# Patient Record
Sex: MALE | Race: WHITE | NOT HISPANIC OR LATINO | URBAN - METROPOLITAN AREA
[De-identification: names, ages, dates, MRNs, and addresses within clinical notes are randomized per-mention and may not be internally consistent; named-entity substitution may affect disease eponyms.]

---

## 2020-03-04 ENCOUNTER — OFFICE VISIT (OUTPATIENT)
Dept: CARDIOLOGY | Facility: PHYSICIAN GROUP | Age: 65
End: 2020-03-04
Payer: MEDICAID

## 2020-03-04 VITALS
BODY MASS INDEX: 27.3 KG/M2 | WEIGHT: 206 LBS | HEART RATE: 72 BPM | DIASTOLIC BLOOD PRESSURE: 70 MMHG | HEIGHT: 73 IN | SYSTOLIC BLOOD PRESSURE: 100 MMHG | OXYGEN SATURATION: 93 %

## 2020-03-04 DIAGNOSIS — H93.19 TINNITUS, UNSPECIFIED LATERALITY: ICD-10-CM

## 2020-03-04 DIAGNOSIS — R00.2 PALPITATIONS: ICD-10-CM

## 2020-03-04 DIAGNOSIS — R42 VERTIGO: ICD-10-CM

## 2020-03-04 DIAGNOSIS — R06.09 DOE (DYSPNEA ON EXERTION): ICD-10-CM

## 2020-03-04 DIAGNOSIS — I10 ESSENTIAL HYPERTENSION: ICD-10-CM

## 2020-03-04 PROCEDURE — 93000 ELECTROCARDIOGRAM COMPLETE: CPT | Performed by: INTERNAL MEDICINE

## 2020-03-04 PROCEDURE — 99204 OFFICE O/P NEW MOD 45 MIN: CPT | Performed by: INTERNAL MEDICINE

## 2020-03-04 RX ORDER — HYDROXYZINE HYDROCHLORIDE 25 MG/1
25 TABLET, FILM COATED ORAL 3 TIMES DAILY PRN
COMMUNITY
Start: 2020-01-31

## 2020-03-04 RX ORDER — POLYETHYLENE GLYCOL 3350 17 G/17G
POWDER, FOR SOLUTION ORAL
COMMUNITY
Start: 2019-12-19

## 2020-03-04 RX ORDER — PREDNISONE 20 MG/1
TABLET ORAL
COMMUNITY
Start: 2020-02-03 | End: 2020-03-04

## 2020-03-04 RX ORDER — LAMOTRIGINE 25 MG/1
TABLET ORAL
COMMUNITY
Start: 2020-01-31 | End: 2020-03-04

## 2020-03-04 RX ORDER — FLUTICASONE PROPIONATE 50 MCG
SPRAY, SUSPENSION (ML) NASAL
COMMUNITY
Start: 2020-01-31

## 2020-03-04 NOTE — LETTER
[facility]   [address]  [place] [address]  Phone: [phone]  Fax: [phone]              Velasquez Yepez  1955    Encounter Date: 3/4/2020    Linda Verduzco M.D.          PROGRESS NOTE:  Subjective:   Chief Complaint:   Chief Complaint   Patient presents with   • Palpitations   • HTN (Controlled)       Velasquez Yepez is a 64 y.o. male who is referred by Dr. Toshia Thomason to establish with cardiology.     A few years ago someone mentioned he would have HTN.  Then he was inpt for phychiatric care, noted heart racing and saw vein pulsing quickly.  He recalls feeling dizzy and feeling the heart racing.  Has some baseline vertigo and tinnitus but both get worse with the racing.  Feels vision might be compromised.  Initially felt slightly fast and forceful and then later irregular.  Was happening several times per day.  Possible made worse by anxiety.  Was placed on metoprolol ~ Nov 2019 for HTN and arrhythmia.  He rarely has it now.  He feels well.    Has been on metoprolol.    Drinks 2 cups of coffee daily.    Has hypertension by history but only on metoprolol and blood pressure borderline low today.    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    He is not limited by chest pain, pressure or tightness with activity.   Used to walk 4-5 miles, now can walk about 1 mile.  Does have mild dyspnea on exertion.  No orthopnea or lower extremity swelling.   Again, has vertigo and some imbalance issues which he atributes to hepatitis C.  No presyncope/syncope.   No symptoms of leg claudication.   No stroke/TIA like symptoms.    Older son had CVAs in the setting of ETOH and drug use.  No family history of premature coronary artery disease.  No atrial fibrillation.  Former smoker, quit more than 30 years.  No hyperlipidemia.  No history of diabetes.  No history of autoimmune disease such as lupus or rheumatoid  arthritis.  No chronic kidney disease.    Has chronic hepatitis C, working on qualifying for treatment.  Has bipolar disorder and general anxiety disorder.  Has PTSD.  His 22 yo grand daughter , left behind a 7 month old child.  Has 2 sons, live in [facility].    Lives in [place], moved here at 19.  Worked in [employer].  Now living in apartment, good access to food, medications.    I did review PCP note dated 2019, psychiatric history noted.    DATA REVIEWED by me:  ECG 3-4-2020  Sinus, 68    ECG 2019  Sinus, rate 78    Echo pending    Most recent labs:     None available    No results found for: HEMOGLOBIN, HEMATOCRIT, MCV, INR   No results found for: SODIUM, POTASSIUM, CHLORIDE, CO2, GLUCOSE, BUN, CREATININE, GLOMRATE   No results found for: ASTSGOT, ALTSGPT, ALBUMIN   No results found for: CHOLSTRLTOT, LDL, HDL, TRIGLYCERIDE      History reviewed. No pertinent past medical history.  History reviewed. No pertinent surgical history.  History reviewed. No pertinent family history.  Social History     Socioeconomic History   • Marital status: Unknown     Spouse name: Not on file   • Number of children: Not on file   • Years of education: Not on file   • Highest education level: Not on file   Occupational History   • Not on file   Social Needs   • Financial resource strain: Not on file   • Food insecurity     Worry: Not on file     Inability: Not on file   • Transportation needs     Medical: Not on file     Non-medical: Not on file   Tobacco Use   • Smoking status: Former Smoker     Years: 50.00     Types: Cigarettes   • Smokeless tobacco: Never Used   Substance and Sexual Activity   • Alcohol use: Not Currently     Comment: in AAA   • Drug use: Not Currently     Types: Marijuana, Cocaine     Comment: last used 30 year form    • Sexual activity: Not on file   Lifestyle   • Physical activity     Days per week: Not on file     Minutes per session: Not on file   • Stress: Not on file    "  Relationships   • Social connections     Talks on phone: Not on file     Gets together: Not on file     Attends Shinto service: Not on file     Active member of club or organization: Not on file     Attends meetings of clubs or organizations: Not on file     Relationship status: Not on file   • Intimate partner violence     Fear of current or ex partner: Not on file     Emotionally abused: Not on file     Physically abused: Not on file     Forced sexual activity: Not on file   Other Topics Concern   • Not on file   Social History Narrative   • Not on file     Allergies   Allergen Reactions   • Oxycodone Unspecified     Aggressive and angry        Current Outpatient Medications   Medication Sig Dispense Refill   • fluticasone (FLONASE) 50 MCG/ACT nasal spray USE 1 SPRAY(S) IN EACH NOSTRIL TWICE DAILY     • hydrOXYzine HCl (ATARAX) 25 MG Tab Take 25 mg by mouth 3 times a day as needed.     • metoprolol (LOPRESSOR) 25 MG Tab Take 25 mg by mouth 2 times a day.     • polyethylene glycol/lytes (MIRALAX) Pack MIX 1 PACKET WITH 8 OUNCES OF WATER JUICE SODA COFFEE OR TEA AND DRINK ONCE DAILY       No current facility-administered medications for this visit.        ROS  All others systems reviewed and negative.     Objective:     /70 (BP Location: Right arm, Patient Position: Sitting)   Pulse 72   Ht 1.854 m (6' 1\")   Wt 93.4 kg (206 lb)   SpO2 93%  Body mass index is 27.18 kg/m².    Physical Exam   General: No acute distress. Well nourished.  HEENT: EOM grossly intact, no scleral icterus, no pharyngeal erythema.   Neck:  No JVD, no bruits, trachea midline  CVS: RRR. Normal S1, S2. No M/R/G. No LE edema.  2+ radial pulses, 2+ PT pulses  Resp: CTAB. No wheezing or crackles/rhonchi. Normal respiratory effort.  Abdomen: Soft, NT, no nidia hepatomegaly.  MSK/Ext: No clubbing or cyanosis.  Skin: Warm and dry, no rashes.  Neurological: CN III-XII grossly intact. No focal deficits.   Psych: A&O x 3, appropriate " affect, good judgement      Assessment:     1. Palpitations  EKG    EC-ECHOCARDIOGRAM COMPLETE W/O CONT    Comp Metabolic Panel    CBC WITHOUT DIFFERENTIAL    TSH WITH REFLEX TO FT4    Holter Monitor / Event Recorder   2. Essential hypertension  EKG    Lipid Profile   3. Vertigo  EC-ECHOCARDIOGRAM COMPLETE W/O CONT   4. Tinnitus, unspecified laterality     5. PAGAN (dyspnea on exertion)         Medical Decision Making:  Today's Assessment / Status / Plan:     -His palpitations are well controlled, the only issue would be looking for afib/flutter.  -Echo to assess left atrial size.  -Blood work, TSH, CBC, CMP  -Lipids to assess 10 year risk  -No concerns about Hep C treatment  -For now, 24 hour holter,  I will consider stopping metoprolol and monitor longer depending on echo and 24 hour results.  -RTC 4-6 weeks to go over results, decide if more testing is needed and in the meantime we will call with results.      Return in about 4 weeks (around 4/1/2020), or AB or Dax 4-6 weeks.    It is my pleasure to participate in the care of Mr. Yepez.  Please do not hesitate to contact me with questions or concerns.    Linda Verduzco MD, [facility]  Cardiologist [facility]    Please note that this dictation was created using voice recognition software. I have made every reasonable attempt to correct obvious errors, but it is possible there are errors of grammar and possibly content that I did not discover before finalizing the note.      Toshia Thomason M.D.  [address] [facility] [address]  VIA Facsimile: [phone]

## 2020-03-04 NOTE — PROGRESS NOTES
Subjective:   Chief Complaint:   Chief Complaint   Patient presents with   • Palpitations   • HTN (Controlled)       Velasquez Yepez is a 64 y.o. male who is referred by Dr. Toshia Thomason to establish with cardiology.     A few years ago someone mentioned he would have HTN.  Then he was inpt for phychiatric care, noted heart racing and saw vein pulsing quickly.  He recalls feeling dizzy and feeling the heart racing.  Has some baseline vertigo and tinnitus but both get worse with the racing.  Feels vision might be compromised.  Initially felt slightly fast and forceful and then later irregular.  Was happening several times per day.  Possible made worse by anxiety.  Was placed on metoprolol ~ 2019 for HTN and arrhythmia.  He rarely has it now.  He feels well.    Has been on metoprolol.    Drinks 2 cups of coffee daily.    Has hypertension by history but only on metoprolol and blood pressure borderline low today.    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    He is not limited by chest pain, pressure or tightness with activity.   Used to walk 4-5 miles, now can walk about 1 mile.  Does have mild dyspnea on exertion.  No orthopnea or lower extremity swelling.   Again, has vertigo and some imbalance issues which he atributes to hepatitis C.  No presyncope/syncope.   No symptoms of leg claudication.   No stroke/TIA like symptoms.    Older son had CVAs in the setting of ETOH and drug use.  No family history of premature coronary artery disease.  No atrial fibrillation.  Former smoker, quit more than 30 years.  No hyperlipidemia.  No history of diabetes.  No history of autoimmune disease such as lupus or rheumatoid arthritis.  No chronic kidney disease.    Has chronic hepatitis C, working on qualifying for treatment.  Has bipolar disorder and general anxiety disorder.  Has PTSD.  His 22 yo grand daughter , left behind a 7 month old child.  Has 2 sons, live in [place] [facility] [place].    Lives in [place], moved here at 19.  Worked in [employer].  Now living in apartment, good access to food, medications.    I did review PCP note dated 12/11/2019, psychiatric history noted.    DATA REVIEWED by me:  ECG 3-4-2020  Sinus, 68    ECG 12/11/2019  Sinus, rate 78    Echo pending    Most recent labs:     None available    No results found for: HEMOGLOBIN, HEMATOCRIT, MCV, INR   No results found for: SODIUM, POTASSIUM, CHLORIDE, CO2, GLUCOSE, BUN, CREATININE, GLOMRATE   No results found for: ASTSGOT, ALTSGPT, ALBUMIN   No results found for: CHOLSTRLTOT, LDL, HDL, TRIGLYCERIDE      History reviewed. No pertinent past medical history.  History reviewed. No pertinent surgical history.  History reviewed. No pertinent family history.  Social History     Socioeconomic History   • Marital status: Unknown     Spouse name: Not on file   • Number of children: Not on file   • Years of education: Not on file   • Highest education level: Not on file   Occupational History   • Not on file   Social Needs   • Financial resource strain: Not on file   • Food insecurity     Worry: Not on file     Inability: Not on file   • Transportation needs     Medical: Not on file     Non-medical: Not on file   Tobacco Use   • Smoking status: Former Smoker     Years: 50.00     Types: Cigarettes   • Smokeless tobacco: Never Used   Substance and Sexual Activity   • Alcohol use: Not Currently     Comment: in AAA   • Drug use: Not Currently     Types: Marijuana, Cocaine     Comment: last used 30 year form 2020   • Sexual activity: Not on file   Lifestyle   • Physical activity     Days per week: Not on file     Minutes per session: Not on file   • Stress: Not on file   Relationships   • Social connections     Talks on phone: Not on file     Gets together: Not on file     Attends Congregational service: Not on file     Active member of club or organization: Not on file     Attends meetings of clubs or organizations: Not on file     Relationship  "status: Not on file   • Intimate partner violence     Fear of current or ex partner: Not on file     Emotionally abused: Not on file     Physically abused: Not on file     Forced sexual activity: Not on file   Other Topics Concern   • Not on file   Social History Narrative   • Not on file     Allergies   Allergen Reactions   • Oxycodone Unspecified     Aggressive and angry        Current Outpatient Medications   Medication Sig Dispense Refill   • fluticasone (FLONASE) 50 MCG/ACT nasal spray USE 1 SPRAY(S) IN EACH NOSTRIL TWICE DAILY     • hydrOXYzine HCl (ATARAX) 25 MG Tab Take 25 mg by mouth 3 times a day as needed.     • metoprolol (LOPRESSOR) 25 MG Tab Take 25 mg by mouth 2 times a day.     • polyethylene glycol/lytes (MIRALAX) Pack MIX 1 PACKET WITH 8 OUNCES OF WATER JUICE SODA COFFEE OR TEA AND DRINK ONCE DAILY       No current facility-administered medications for this visit.        ROS  All others systems reviewed and negative.     Objective:     /70 (BP Location: Right arm, Patient Position: Sitting)   Pulse 72   Ht 1.854 m (6' 1\")   Wt 93.4 kg (206 lb)   SpO2 93%  Body mass index is 27.18 kg/m².    Physical Exam   General: No acute distress. Well nourished.  HEENT: EOM grossly intact, no scleral icterus, no pharyngeal erythema.   Neck:  No JVD, no bruits, trachea midline  CVS: RRR. Normal S1, S2. No M/R/G. No LE edema.  2+ radial pulses, 2+ PT pulses  Resp: CTAB. No wheezing or crackles/rhonchi. Normal respiratory effort.  Abdomen: Soft, NT, no nidia hepatomegaly.  MSK/Ext: No clubbing or cyanosis.  Skin: Warm and dry, no rashes.  Neurological: CN III-XII grossly intact. No focal deficits.   Psych: A&O x 3, appropriate affect, good judgement      Assessment:     1. Palpitations  EKG    EC-ECHOCARDIOGRAM COMPLETE W/O CONT    Comp Metabolic Panel    CBC WITHOUT DIFFERENTIAL    TSH WITH REFLEX TO FT4    Holter Monitor / Event Recorder   2. Essential hypertension  EKG    Lipid Profile   3. Vertigo  " EC-ECHOCARDIOGRAM COMPLETE W/O CONT   4. Tinnitus, unspecified laterality     5. PAGAN (dyspnea on exertion)         Medical Decision Making:  Today's Assessment / Status / Plan:     -His palpitations are well controlled, the only issue would be looking for afib/flutter.  -Echo to assess left atrial size.  -Blood work, TSH, CBC, CMP  -Lipids to assess 10 year risk  -No concerns about Hep C treatment  -For now, 24 hour holter,  I will consider stopping metoprolol and monitor longer depending on echo and 24 hour results.  -RTC 4-6 weeks to go over results, decide if more testing is needed and in the meantime we will call with results.      Return in about 4 weeks (around 4/1/2020), or AB or Dax 4-6 weeks.    It is my pleasure to participate in the care of Mr. Yepez.  Please do not hesitate to contact me with questions or concerns.    Linda Verduzco MD, [facility]  Cardiologist [facility]    Please note that this dictation was created using voice recognition software. I have made every reasonable attempt to correct obvious errors, but it is possible there are errors of grammar and possibly content that I did not discover before finalizing the note.

## 2020-03-11 ENCOUNTER — NON-PROVIDER VISIT (OUTPATIENT)
Dept: CARDIOLOGY | Facility: PHYSICIAN GROUP | Age: 65
End: 2020-03-11
Payer: MEDICAID

## 2020-03-11 ENCOUNTER — ANCILLARY PROCEDURE (OUTPATIENT)
Dept: CARDIOLOGY | Facility: IMAGING CENTER | Age: 65
End: 2020-03-11
Attending: INTERNAL MEDICINE
Payer: MEDICAID

## 2020-03-11 DIAGNOSIS — R00.2 PALPITATIONS: ICD-10-CM

## 2020-03-11 DIAGNOSIS — R42 VERTIGO: ICD-10-CM

## 2020-03-11 LAB
LV EJECT FRACT  99904: 65
LV EJECT FRACT MOD 2C 99903: 55.14
LV EJECT FRACT MOD 4C 99902: 57.16
LV EJECT FRACT MOD BP 99901: 56.66

## 2020-03-11 PROCEDURE — 93306 TTE W/DOPPLER COMPLETE: CPT | Performed by: INTERNAL MEDICINE

## 2020-03-13 ENCOUNTER — TELEPHONE (OUTPATIENT)
Dept: HEALTH INFORMATION MANAGEMENT | Facility: OTHER | Age: 65
End: 2020-03-13

## 2020-03-13 NOTE — TELEPHONE ENCOUNTER
1. Caller Name: Velasquez Yepez                      Call Back Number: [phone]  [facility] PCP or Specialty Provider: Yes         2.  Does patient have any active symptoms of respiratory illness (fever OR cough OR shortness of breath)? No.    3.  Does patient have any comoribidities? None     4.  In the last 30 days, has the patient traveled outside of the country OR in a high risk area within the [place] OR have any known contact with someone who has or is suspected to have COVID-19?  No.    5. Disposition: Advised to perform self care, monitor for worsening symptoms and to call back in 3 days if no improvement      Note routed to PCP: JULIA only.

## 2020-03-18 ENCOUNTER — TELEPHONE (OUTPATIENT)
Dept: CARDIOLOGY | Facility: MEDICAL CENTER | Age: 65
End: 2020-03-18

## 2020-03-20 ENCOUNTER — TELEPHONE (OUTPATIENT)
Dept: CARDIOLOGY | Facility: MEDICAL CENTER | Age: 65
End: 2020-03-20

## 2020-03-20 DIAGNOSIS — R00.2 PALPITATIONS: ICD-10-CM

## 2020-03-20 NOTE — TELEPHONE ENCOUNTER
need FUTURE holter order only, LS ordered 24 hour holter, dx: palps   Received: Today   Message Contents   Renata Grimes, Med Ass't  Dayana Weber RKacieN.       Done

## 2020-03-24 LAB — EKG IMPRESSION: NORMAL

## 2020-03-24 PROCEDURE — 93227 XTRNL ECG REC<48 HR R&I: CPT | Performed by: INTERNAL MEDICINE

## 2020-03-27 NOTE — TELEPHONE ENCOUNTER
----- Message from Linda Verduzco M.D. sent at 3/11/2020  6:36 PM PDT -----  K,  Please call him at [phone] to let him know his echo was normal.  Thank you!   EYLEA AND REPEAT EVERY 7 WKS X 3 - TRACE EDEMA AT 6 WKS - TX AND EXTEND TO 7 WKS.

## 2020-04-22 ENCOUNTER — APPOINTMENT (OUTPATIENT)
Dept: CARDIOLOGY | Facility: PHYSICIAN GROUP | Age: 65
End: 2020-04-22
Payer: MEDICAID

## 2020-04-27 NOTE — PROGRESS NOTES
No, please call him to let him know his heart monitor results were normal.  Thank you.  His phone number is [phone]

## 2020-04-28 ENCOUNTER — TELEPHONE (OUTPATIENT)
Dept: CARDIOLOGY | Facility: MEDICAL CENTER | Age: 65
End: 2020-04-28

## 2020-04-28 NOTE — TELEPHONE ENCOUNTER
No, please call him to let him know his heart monitor results were normal.  Thank you.  His phone number is [phone]    _____________    Contacted patient.  Discussed LS note. Patient states he will follow up with his PCP.  Advised patient to contact clinic for new or worsening symptoms. Patient verbalizes understanding.

## 2020-12-10 ENCOUNTER — TELEMEDICINE (OUTPATIENT)
Dept: CARDIOLOGY | Facility: MEDICAL CENTER | Age: 65
End: 2020-12-10
Payer: MEDICAID

## 2020-12-10 VITALS — BODY MASS INDEX: 26.51 KG/M2 | HEIGHT: 73 IN | WEIGHT: 200 LBS

## 2020-12-10 DIAGNOSIS — I10 HTN (HYPERTENSION), MALIGNANT: ICD-10-CM

## 2020-12-10 DIAGNOSIS — R00.2 PALPITATIONS: ICD-10-CM

## 2020-12-10 DIAGNOSIS — I49.1 PREMATURE ATRIAL COMPLEX: ICD-10-CM

## 2020-12-10 DIAGNOSIS — I49.3 PREMATURE VENTRICULAR COMPLEX: ICD-10-CM

## 2020-12-10 PROCEDURE — 99214 OFFICE O/P EST MOD 30 MIN: CPT | Mod: CR | Performed by: INTERNAL MEDICINE

## 2020-12-10 RX ORDER — LINACLOTIDE 145 UG/1
CAPSULE, GELATIN COATED ORAL
COMMUNITY
Start: 2020-11-24

## 2020-12-10 RX ORDER — TRAZODONE HYDROCHLORIDE 50 MG/1
50 TABLET ORAL
COMMUNITY
Start: 2020-11-30

## 2020-12-10 RX ORDER — VELPATASVIR AND SOFOSBUVIR 100; 400 MG/1; MG/1
TABLET, FILM COATED ORAL
COMMUNITY
Start: 2020-10-02

## 2020-12-10 ASSESSMENT — ENCOUNTER SYMPTOMS
HEADACHES: 0
SENSORY CHANGE: 0
DOUBLE VISION: 0
WEIGHT LOSS: 0
BLOOD IN STOOL: 0
COUGH: 0
DEPRESSION: 0
EYE DISCHARGE: 0
FALLS: 0
HALLUCINATIONS: 0
NAUSEA: 0
BLURRED VISION: 0
EYE PAIN: 0
CHILLS: 0
PND: 0
SHORTNESS OF BREATH: 0
LOSS OF CONSCIOUSNESS: 0
MYALGIAS: 0
PALPITATIONS: 0
FEVER: 0
VOMITING: 0
CLAUDICATION: 0
SPEECH CHANGE: 0
ORTHOPNEA: 0
DIZZINESS: 0
BRUISES/BLEEDS EASILY: 0
ABDOMINAL PAIN: 0

## 2020-12-10 NOTE — PROGRESS NOTES
Chief Complaint   Patient presents with   • Palpitations   • Fatigue   Of note, this visit was done through telemedicine with video on demand through epic system.  This visit complies with Medicare guidelines during this current COVID-19 pandemic.    This encounter was conducted via [employer].  Verbal consent was obtained. Patient's identity was verified.      Subjective:   Velasquez Yepez is a 65 y.o. male who presents today for palpitations, PACs, PVCs.    Just finished Hepatitis C treatment.    I have independently interpreted and reviewed echocardiogram's actual images with patient which showed normal left ventricular systolic function. No wall motion abnormality. No evidence of pulmonary hypertension. No significant valvular disease.    Was not able to tolerate metoprolol therapy.    Would like to switch Cardiologist at this time.    No past medical history on file.  No past surgical history on file.  No family history on file.  Social History     Socioeconomic History   • Marital status: Unknown     Spouse name: Not on file   • Number of children: Not on file   • Years of education: Not on file   • Highest education level: Not on file   Occupational History   • Not on file   Social Needs   • Financial resource strain: Not on file   • Food insecurity     Worry: Not on file     Inability: Not on file   • Transportation needs     Medical: Not on file     Non-medical: Not on file   Tobacco Use   • Smoking status: Former Smoker     Years: 50.00     Types: Cigarettes   • Smokeless tobacco: Never Used   Substance and Sexual Activity   • Alcohol use: Not Currently     Comment: in AAA   • Drug use: Not Currently     Types: Marijuana, Cocaine     Comment: last used 30 year form 2020   • Sexual activity: Not on file   Lifestyle   • Physical activity     Days per week: Not on file     Minutes per session: Not on file   • Stress: Not on file   Relationships   • Social connections     Talks on phone: Not on file     Gets  together: Not on file     Attends Shinto service: Not on file     Active member of club or organization: Not on file     Attends meetings of clubs or organizations: Not on file     Relationship status: Not on file   • Intimate partner violence     Fear of current or ex partner: Not on file     Emotionally abused: Not on file     Physically abused: Not on file     Forced sexual activity: Not on file   Other Topics Concern   • Not on file   Social History Narrative   • Not on file     Allergies   Allergen Reactions   • Oxycodone Unspecified     Aggressive and angry      Outpatient Encounter Medications as of 12/10/2020   Medication Sig Dispense Refill   • LINZESS 145 MCG Cap 1 time a day as needed.     • traZODone (DESYREL) 50 MG Tab Take 50 mg by mouth at bedtime as needed.     • fluticasone (FLONASE) 50 MCG/ACT nasal spray USE 1 SPRAY(S) IN EACH NOSTRIL TWICE DAILY     • hydrOXYzine HCl (ATARAX) 25 MG Tab Take 25 mg by mouth 3 times a day as needed.     • polyethylene glycol/lytes (MIRALAX) Pack MIX 1 PACKET WITH 8 OUNCES OF WATER JUICE SODA COFFEE OR TEA AND DRINK ONCE DAILY     • EPCLUSA 400-100 MG Tab      • metoprolol (LOPRESSOR) 25 MG Tab Take 25 mg by mouth 2 times a day.       No facility-administered encounter medications on file as of 12/10/2020.      Review of Systems   Constitutional: Negative for chills, fever, malaise/fatigue and weight loss.   HENT: Negative for ear discharge, ear pain, hearing loss and nosebleeds.    Eyes: Negative for blurred vision, double vision, pain and discharge.   Respiratory: Negative for cough and shortness of breath.    Cardiovascular: Negative for chest pain, palpitations, orthopnea, claudication, leg swelling and PND.   Gastrointestinal: Negative for abdominal pain, blood in stool, melena, nausea and vomiting.   Genitourinary: Negative for dysuria and hematuria.   Musculoskeletal: Negative for falls, joint pain and myalgias.   Skin: Negative for itching and rash.  "  Neurological: Negative for dizziness, sensory change, speech change, loss of consciousness and headaches.   Endo/Heme/Allergies: Negative for environmental allergies. Does not bruise/bleed easily.   Psychiatric/Behavioral: Negative for depression, hallucinations and suicidal ideas.        Objective:   Ht 1.854 m (6' 1\")   Wt 90.7 kg (200 lb)   BMI 26.39 kg/m²     Physical Exam  Constitutional:   Well appearing, no acute distress  Heart: no pitting edema in BLE.  Lungs: no breathing distress, not tachypneic  Abdomen: no distention  Neurology: no signs of focal deficits.  Mentation is alert.    Assessment:     1. Palpitations     2. Premature atrial complex     3. Premature ventricular complex     4. HTN (hypertension), malignant         Medical Decision Making:  Today's Assessment / Status / Plan:   At this time patient is clinically stable in terms of his cardiac standpoint.  Clinical monitor for PACs and PVCs. NO medications for the time being.  "